# Patient Record
(demographics unavailable — no encounter records)

---

## 2025-05-17 NOTE — HISTORY OF PRESENT ILLNESS
[Father] : father [Yes] : Patient goes to dentist yearly [Toothpaste] : Primary Fluoride Source: Toothpaste [Up to date] : Up to date [Needs Immunizations] : Needs immunizations [Grade: ____] : Grade: [unfilled] [Eats regular meals including adequate fruits and vegetables] : eats regular meals including adequate fruits and vegetables [Drinks non-sweetened liquids] : drinks non-sweetened liquids  [Calcium source] : calcium source [Has friends] : has friends [At least 1 hour of physical activity a day] : at least 1 hour of physical activity a day [Uses safety belts/safety equipment] : uses safety belts/safety equipment  [Has peer relationships free of violence] : has peer relationships free of violence [No] : Patient has not had sexual intercourse [Has ways to cope with stress] : has ways to cope with stress [Displays self-confidence] : displays self-confidence [Has problems with sleep] : has problems with sleep [With Parent/Guardian] : parent/guardian [Has concerns about body or appearance] : does not have concerns about body or appearance [Uses electronic nicotine delivery system] : does not use electronic nicotine delivery system [Exposure to electronic nicotine delivery system] : no exposure to electronic nicotine delivery system [Uses tobacco] : does not use tobacco [Exposure to tobacco] : no exposure to tobacco [Uses drugs] : does not use drugs  [Exposure to drugs] : no exposure to drugs [Drinks alcohol] : drinks alcohol [Exposure to alcohol] : exposure to alcohol [Impaired/distracted driving] : no impaired/distracted driving [Gets depressed, anxious, or irritable/has mood swings] : does not get depressed, anxious, or irritable/has mood swings [Has thought about hurting self or considered suicide] : has not thought about hurting self or considered suicide [FreeTextEntry7] : 17 y/o M here for JOSÉ [de-identified] : + braces [de-identified] : got Flu and COVID vaccines at [facility] in the Fall; due for Men ACWY and MenB vaccines [de-identified] : plan to go to [employer] ([employer]) [de-identified] : Discussed no alcohol/drugs [FreeTextEntry1] : seeing Ophthalmologist

## 2025-05-17 NOTE — RISK ASSESSMENT

## 2025-05-17 NOTE — PHYSICAL EXAM

## 2025-05-17 NOTE — DISCUSSION/SUMMARY
[Normal Growth] : growth [Normal Development] : development  [No Elimination Concerns] : elimination [Continue Regimen] : feeding [No Skin Concerns] : skin [Normal Sleep Pattern] : sleep [None] : no medical problems [Add Food/Vitamin] : add ~M [Anticipatory Guidance Given] : Anticipatory guidance addressed as per the history of present illness section [Physical Growth and Development] : physical growth and development [Social and Academic Competence] : social and academic competence [Emotional Well-Being] : emotional well-being [Risk Reduction] : risk reduction [Violence and Injury Prevention] : violence and injury prevention [No Vaccines] : no vaccines needed [No Medications] : ~He/She~ is not on any medications [Patient] : patient [Parent/Guardian] : Parent/Guardian [] : The components of the vaccine(s) to be administered today are listed in the plan of care. The disease(s) for which the vaccine(s) are intended to prevent and the risks have been discussed with the caretaker.  The risks are also included in the appropriate vaccination information statements which have been provided to the patient's caregiver.  The caregiver has given consent to vaccinate. [FreeTextEntry1] : 16 year old male  Continue balanced diet with all food groups. Multivitamins advised. Brush teeth twice a day with toothbrush. Recommend visit to dentist. Maintain consistent daily routines and sleep schedule. Personal hygiene, puberty, and sexual health reviewed. Risky behaviors assessed. School discussed. Limit screen time to no more than 2 hours per day. Encourage physical activity. Return 1 year for routine well child check.  Will obtain routine labs Vaccines (MenACWY #1 and Men B - [place] #1) given today, SE, risks and benefits explained, cool compresses and Acetaminophen as needed. f/u Ophthal  BP10% wash samples given to pt.  Clindamycin swab prescribed for Acne.